# Patient Record
Sex: FEMALE | Race: WHITE | NOT HISPANIC OR LATINO | URBAN - METROPOLITAN AREA
[De-identification: names, ages, dates, MRNs, and addresses within clinical notes are randomized per-mention and may not be internally consistent; named-entity substitution may affect disease eponyms.]

---

## 2017-10-02 ENCOUNTER — APPOINTMENT (OUTPATIENT)
Age: 82
Setting detail: DERMATOLOGY
End: 2017-10-03

## 2017-10-02 VITALS — SYSTOLIC BLOOD PRESSURE: 139 MMHG | DIASTOLIC BLOOD PRESSURE: 89 MMHG | HEART RATE: 69 BPM

## 2017-10-02 VITALS
WEIGHT: 140 LBS | SYSTOLIC BLOOD PRESSURE: 140 MMHG | DIASTOLIC BLOOD PRESSURE: 78 MMHG | HEIGHT: 58 IN | HEART RATE: 70 BPM

## 2017-10-02 PROBLEM — C44.311 BASAL CELL CARCINOMA OF SKIN OF NOSE: Status: ACTIVE | Noted: 2017-10-02

## 2017-10-02 PROCEDURE — OTHER CONSULTATION FOR MOHS SURGERY: OTHER

## 2017-10-02 PROCEDURE — 17311 MOHS 1 STAGE H/N/HF/G: CPT

## 2017-10-02 PROCEDURE — OTHER MOHS SURGERY: OTHER

## 2017-10-02 PROCEDURE — 17312 MOHS ADDL STAGE: CPT

## 2017-10-02 PROCEDURE — OTHER PATIENT SPECIFIC COUNSELING: OTHER

## 2017-10-02 NOTE — PROCEDURE: CONSULTATION FOR MOHS SURGERY
X Size Of Lesion In Cm (Optional): 0.9
Diagnosis Override (If N/A Will Use Parent Diagnosis): Infiltrative Basal Cell Carcinoma
Size Of Lesion: 1.3
Incorporate Mauc In Note: Yes
Detail Level: Detailed
Body Location Override (Optional - Billing Will Still Be Based On Selected Body Map Location If Applicable): left nose
Location Indication Override (Is Already Calculated Based On Selected Body Location): Area H
Name Of The Referring Provider For Procedure: Dr. Rangel

## 2017-10-02 NOTE — PROCEDURE: MOHS SURGERY
Body Location Override (Optional - Billing Will Still Be Based On Selected Body Map Location If Applicable): left nose

## 2017-10-02 NOTE — PROCEDURE: PATIENT SPECIFIC COUNSELING
Detail Level: Detailed
Due to size of defect discussed closure options including referral to plastics for a flap or combined graft closure of the site have been discussed in detail with the patient and her daughter Heather. The patient has been scheduled for a consultation with Dr. Sahu on 10/3/17 at 9:15am at which time the date of the post-operative repair will be set. All questions were answered.

## 2017-10-03 ENCOUNTER — APPOINTMENT (OUTPATIENT)
Age: 82
Setting detail: DERMATOLOGY
End: 2017-10-06

## 2017-10-03 PROBLEM — C44.91 BASAL CELL CARCINOMA OF SKIN, UNSPECIFIED: Status: ACTIVE | Noted: 2017-10-03

## 2017-10-03 PROCEDURE — OTHER PRESCRIPTION: OTHER

## 2017-10-03 RX ORDER — CEPHALEXIN 500 MG/1
CAPSULE ORAL
Qty: 20 | Refills: 0 | Status: ERX | COMMUNITY
Start: 2017-10-03

## 2018-04-03 ENCOUNTER — APPOINTMENT (OUTPATIENT)
Age: 83
Setting detail: DERMATOLOGY
End: 2018-04-05

## 2018-04-03 VITALS
HEIGHT: 59 IN | HEART RATE: 83 BPM | WEIGHT: 130 LBS | SYSTOLIC BLOOD PRESSURE: 168 MMHG | DIASTOLIC BLOOD PRESSURE: 80 MMHG

## 2018-04-03 DIAGNOSIS — Z48.817 ENCOUNTER FOR SURGICAL AFTERCARE FOLLOWING SURGERY ON THE SKIN AND SUBCUTANEOUS TISSUE: ICD-10-CM

## 2018-04-03 PROCEDURE — OTHER MIPS QUALITY: OTHER

## 2018-04-03 PROCEDURE — OTHER PATIENT SPECIFIC COUNSELING: OTHER

## 2018-04-03 ASSESSMENT — LOCATION SIMPLE DESCRIPTION DERM: LOCATION SIMPLE: LEFT NOSE

## 2018-04-03 ASSESSMENT — LOCATION DETAILED DESCRIPTION DERM: LOCATION DETAILED: LEFT NASAL ALA

## 2018-04-03 ASSESSMENT — PAIN INTENSITY VAS: HOW INTENSE IS YOUR PAIN 0 BEING NO PAIN, 10 BEING THE MOST SEVERE PAIN POSSIBLE?: NO PAIN

## 2018-04-03 ASSESSMENT — LOCATION ZONE DERM: LOCATION ZONE: NOSE

## 2018-04-03 NOTE — PROCEDURE: PATIENT SPECIFIC COUNSELING
Reassured patient the site has healed well following repair by Dr. Sahu and that the slight noted “tightness” is due to numbness following her lengthy procedure and should dissipate with time. Advised continued follow up with Dr. Rangel for any further dermatological needs including biannual skin examinations to further monitor for skin malignancies.
Detail Level: Detailed